# Patient Record
Sex: FEMALE | Race: WHITE | Employment: OTHER | URBAN - METROPOLITAN AREA
[De-identification: names, ages, dates, MRNs, and addresses within clinical notes are randomized per-mention and may not be internally consistent; named-entity substitution may affect disease eponyms.]

---

## 2017-11-09 ENCOUNTER — HOSPITAL ENCOUNTER (OUTPATIENT)
Dept: CT IMAGING | Age: 52
Discharge: HOME | End: 2017-11-09
Payer: COMMERCIAL

## 2017-11-09 DIAGNOSIS — S70.12XA CONTUSION OF LEFT THIGH: ICD-10-CM

## 2017-11-09 PROCEDURE — 73700 CT LOWER EXTREMITY W/O DYE: CPT

## 2017-11-28 ENCOUNTER — HOSPITAL ENCOUNTER (OUTPATIENT)
Dept: CT IMAGING | Age: 52
Discharge: HOME | End: 2017-11-28
Payer: COMMERCIAL

## 2017-11-28 DIAGNOSIS — M79.81 NONTRAUMATIC HEMATOMA OF SOFT TISSUE: ICD-10-CM

## 2017-11-28 DIAGNOSIS — M79.89 SWELLING OF LIMB: ICD-10-CM

## 2017-11-28 PROCEDURE — 73700 CT LOWER EXTREMITY W/O DYE: CPT

## 2017-12-22 ENCOUNTER — TELEPHONE (OUTPATIENT)
Dept: FAMILY MEDICINE CLINIC | Age: 52
End: 2017-12-22
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
## 2017-12-26 LAB — CREATININE, EXTERNAL: 0.74

## 2018-01-09 ENCOUNTER — HOSPITAL ENCOUNTER (OUTPATIENT)
Dept: GENERAL RADIOLOGY | Age: 53
Discharge: HOME | End: 2018-01-09
Payer: COMMERCIAL

## 2018-01-09 ENCOUNTER — HOSPITAL ENCOUNTER (OUTPATIENT)
Dept: MAMMOGRAPHY | Age: 53
Discharge: HOME | End: 2018-01-09
Attending: FAMILY MEDICINE
Payer: COMMERCIAL

## 2018-01-09 ENCOUNTER — TELEPHONE (OUTPATIENT)
Dept: FAMILY MEDICINE CLINIC | Age: 53
End: 2018-01-09

## 2018-01-09 ENCOUNTER — OFFICE VISIT (OUTPATIENT)
Dept: FAMILY MEDICINE CLINIC | Age: 53
End: 2018-01-09

## 2018-01-09 VITALS
HEIGHT: 64 IN | DIASTOLIC BLOOD PRESSURE: 71 MMHG | TEMPERATURE: 97.8 F | SYSTOLIC BLOOD PRESSURE: 107 MMHG | RESPIRATION RATE: 18 BRPM | WEIGHT: 144 LBS | BODY MASS INDEX: 24.59 KG/M2 | HEART RATE: 84 BPM

## 2018-01-09 DIAGNOSIS — Z87.81 HISTORY OF PELVIC FRACTURE: ICD-10-CM

## 2018-01-09 DIAGNOSIS — Z12.39 BREAST CANCER SCREENING: ICD-10-CM

## 2018-01-09 DIAGNOSIS — Z87.81 HISTORY OF LOWER LEG FRACTURE: ICD-10-CM

## 2018-01-09 DIAGNOSIS — Z23 ENCOUNTER FOR IMMUNIZATION: ICD-10-CM

## 2018-01-09 DIAGNOSIS — Q89.01 SPLEEN ABSENT: ICD-10-CM

## 2018-01-09 DIAGNOSIS — Z87.09 HISTORY OF PNEUMOTHORAX: ICD-10-CM

## 2018-01-09 DIAGNOSIS — M79.642 LEFT HAND PAIN: ICD-10-CM

## 2018-01-09 DIAGNOSIS — Z87.81 HISTORY OF RIB FRACTURE: ICD-10-CM

## 2018-01-09 DIAGNOSIS — S06.9X4A: Primary | ICD-10-CM

## 2018-01-09 DIAGNOSIS — R60.0 LOWER LEG EDEMA: ICD-10-CM

## 2018-01-09 PROBLEM — S06.9XAA CLOSED TBI (TRAUMATIC BRAIN INJURY): Status: ACTIVE | Noted: 2018-01-09

## 2018-01-09 PROCEDURE — 77067 SCR MAMMO BI INCL CAD: CPT

## 2018-01-09 PROCEDURE — 73140 X-RAY EXAM OF FINGER(S): CPT

## 2018-01-09 RX ORDER — LANSOPRAZOLE 30 MG/1
30 CAPSULE, DELAYED RELEASE ORAL
COMMUNITY
End: 2018-01-30

## 2018-01-09 RX ORDER — FUROSEMIDE 20 MG/1
20 TABLET ORAL DAILY
Qty: 30 TAB | Refills: 0 | Status: SHIPPED | OUTPATIENT
Start: 2018-01-09 | End: 2018-01-30 | Stop reason: SDUPTHER
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
## 2018-01-09 NOTE — PATIENT INSTRUCTIONS
Vaccine Information Statement    Influenza (Flu) Vaccine (Inactivated or Recombinant): What you need to know    Many Vaccine Information Statements are available in [place] and other languages. See www.immunize.org/vis  Hojas de Información Sobre Vacunas están disponibles en Español y en muchos otros idiomas. Visite www.immunize.org/vis    1. Why get vaccinated? Influenza (flu) is a contagious disease that spreads around the [place] every year, usually between October and May. Flu is caused by influenza viruses, and is spread mainly by coughing, sneezing, and close contact. Anyone can get flu. Flu strikes suddenly and can last several days. Symptoms vary by age, but can include:   fever/chills   sore throat   muscle aches   fatigue   cough   headache    runny or stuffy nose    Flu can also lead to pneumonia and blood infections, and cause diarrhea and seizures in children. If you have a medical condition, such as heart or lung disease, flu can make it worse. Flu is more dangerous for some people. Infants and young children, people 72years of age and older, pregnant women, and people with certain health conditions or a weakened immune system are at greatest risk. Each year thousands of people in the [facility] die from flu, and many more are hospitalized. Flu vaccine can:   keep you from getting flu,   make flu less severe if you do get it, and   keep you from spreading flu to your family and other people. 2. Inactivated and recombinant flu vaccines    A dose of flu vaccine is recommended every flu season. Children 6 months through 6years of age may need two doses during the same flu season. Everyone else needs only one dose each flu season.        Some inactivated flu vaccines contain a very small amount of a mercury-based preservative called thimerosal. Studies have not shown thimerosal in vaccines to be harmful, but flu vaccines that do not contain thimerosal are available. There is no live flu virus in flu shots. They cannot cause the flu. There are many flu viruses, and they are always changing. Each year a new flu vaccine is made to protect against three or four viruses that are likely to cause disease in the upcoming flu season. But even when the vaccine doesnt exactly match these viruses, it may still provide some protection    Flu vaccine cannot prevent:   flu that is caused by a virus not covered by the vaccine, or   illnesses that look like flu but are not. It takes about 2 weeks for protection to develop after vaccination, and protection lasts through the flu season. 3. Some people should not get this vaccine    Tell the person who is giving you the vaccine:     If you have any severe, life-threatening allergies. If you ever had a life-threatening allergic reaction after a dose of flu vaccine, or have a severe allergy to any part of this vaccine, you may be advised not to get vaccinated. Most, but not all, types of flu vaccine contain a small amount of egg protein.  If you ever had Guillain-Barré Syndrome (also called GBS). Some people with a history of GBS should not get this vaccine. This should be discussed with your doctor.  If you are not feeling well. It is usually okay to get flu vaccine when you have a mild illness, but you might be asked to come back when you feel better. 4. Risks of a vaccine reaction    With any medicine, including vaccines, there is a chance of reactions. These are usually mild and go away on their own, but serious reactions are also possible. Most people who get a flu shot do not have any problems with it.      Minor problems following a flu shot include:    soreness, redness, or swelling where the shot was given     hoarseness   sore, red or itchy eyes   cough   fever   aches   headache   itching   fatigue  If these problems occur, they usually begin soon after the shot and last 1 or 2 days. More serious problems following a flu shot can include the following:     There may be a small increased risk of Guillain-Barré Syndrome (GBS) after inactivated flu vaccine. This risk has been estimated at 1 or 2 additional cases per million people vaccinated. This is much lower than the risk of severe complications from flu, which can be prevented by flu vaccine.  Young children who get the flu shot along with pneumococcal vaccine (PCV13) and/or DTaP vaccine at the same time might be slightly more likely to have a seizure caused by fever. Ask your doctor for more information. Tell your doctor if a child who is getting flu vaccine has ever had a seizure. Problems that could happen after any injected vaccine:      People sometimes faint after a medical procedure, including vaccination. Sitting or lying down for about 15 minutes can help prevent fainting, and injuries caused by a fall. Tell your doctor if you feel dizzy, or have vision changes or ringing in the ears.  Some people get severe pain in the shoulder and have difficulty moving the arm where a shot was given. This happens very rarely.  Any medication can cause a severe allergic reaction. Such reactions from a vaccine are very rare, estimated at about 1 in a million doses, and would happen within a few minutes to a few hours after the vaccination. As with any medicine, there is a very remote chance of a vaccine causing a serious injury or death. The safety of vaccines is always being monitored. For more information, visit: www.cdc.gov/vaccinesafety/    5. What if there is a serious reaction? What should I look for?  Look for anything that concerns you, such as signs of a severe allergic reaction, very high fever, or unusual behavior.     Signs of a severe allergic reaction can include hives, swelling of the face and throat, difficulty breathing, a fast heartbeat, dizziness, and weakness  usually within a few minutes to a few hours after the vaccination. What should I do?  If you think it is a severe allergic reaction or other emergency that cant wait, call 9-1-1 and get the person to the nearest hospital. Otherwise, call your doctor.  Reactions should be reported to the Vaccine Adverse Event Reporting System (VAERS). Your doctor should file this report, or you can do it yourself through  the VAERS web site at www.vaers. [facility].gov, or by calling 5-326.774.2459. VAERS does not give medical advice. 6. The National Vaccine Injury Compensation Program    The [facility] Vaccine Injury Compensation Program (VICP) is a federal program that was created to compensate people who may have been injured by certain vaccines. Persons who believe they may have been injured by a vaccine can learn about the program and about filing a claim by calling 8-261.838.6829 or visiting the [employer] website at www.[facility].gov/vaccinecompensation. There is a time limit to file a claim for compensation. 7. How can I learn more?  Ask your healthcare provider. He or she can give you the vaccine package insert or suggest other sources of information.  Call your local or state health department.  Contact the Centers for Disease Control and Prevention (CDC):  - Call 6-807.249.8052 (1-800-CDC-INFO) or  - Visit CDCs website at www.cdc.gov/flu    Vaccine Information Statement   Inactivated Influenza Vaccine   8/7/2015  42 LEVI Ochoa Amaya [id number]    Department of Health and Human Services  Centers for Disease Control and Prevention    Office Use Only             Leg and Ankle Edema: Care Instructions  Your Care Instructions  Swelling in the legs, ankles, and feet is called edema. It is common after you sit or stand for a while. Long plane flights or car rides often cause swelling in the legs and feet. You may also have swelling if you have to stand for long periods of time at your job.  Problems with the veins in the legs (varicose veins) and changes in hormones can also cause swelling. Sometimes the swelling in the ankles and feet is caused by a more serious problem, such as heart failure, infection, blood clots, or liver or kidney disease. Follow-up care is a key part of your treatment and safety. Be sure to make and go to all appointments, and call your doctor if you are having problems. It's also a good idea to know your test results and keep a list of the medicines you take. How can you care for yourself at home? · If your doctor gave you medicine, take it as prescribed. Call your doctor if you think you are having a problem with your medicine. · Whenever you are resting, raise your legs up. Try to keep the swollen area higher than the level of your heart. · Take breaks from standing or sitting in one position. ¨ Walk around to increase the blood flow in your lower legs. ¨ Move your feet and ankles often while you stand, or tighten and relax your leg muscles. · Wear support stockings. Put them on in the morning, before swelling gets worse. · Eat a balanced diet. Lose weight if you need to. · Limit the amount of salt (sodium) in your diet. Salt holds fluid in the body and may increase swelling. When should you call for help? Call 911 anytime you think you may need emergency care. For example, call if:  ? · You have symptoms of a blood clot in your lung (called a pulmonary embolism). These may include:  ¨ Sudden chest pain. ¨ Trouble breathing. ¨ Coughing up blood. ?Call your doctor now or seek immediate medical care if:  ? · You have signs of a blood clot, such as:  ¨ Pain in your calf, back of the knee, thigh, or groin. ¨ Redness and swelling in your leg or groin. ? · You have symptoms of infection, such as:  ¨ Increased pain, swelling, warmth, or redness. ¨ Red streaks or pus. ¨ A fever. ? Watch closely for changes in your health, and be sure to contact your doctor if:  ? · Your swelling is getting worse.    ? · You have new or worsening pain in your legs. ? · You do not get better as expected. Where can you learn more? Go to http://betsey-ammy.info/. Enter C524 in the search box to learn more about \"Leg and Ankle Edema: Care Instructions. \"  Current as of: March 20, 2017  Content Version: 11.4  © 2359-0202 [employer]. Care instructions adapted under license by [employer] (which disclaims liability or warranty for this information). If you have questions about a medical condition or this instruction, always ask your healthcare professional. [place] [address] any warranty or liability for your use of this information.
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
## 2018-01-09 NOTE — MR AVS SNAPSHOT
Visit Information Date & Time Provider Department Dept. Phone Encounter #  
 1/9/2018  1:00 PM Andre Hardy 34 [id number] Follow-up Instructions Return in about 2 weeks (around 1/23/2018) for leg swelling. Upcoming Health Maintenance Date Due DTaP/Tdap/Td series (1 - Tdap) 6/9/1986 BREAST CANCER SCRN MAMMOGRAM 7/30/2017 PAP AKA CERVICAL CYTOLOGY 4/1/2018 COLONOSCOPY 8/25/2020 Allergies as of 1/9/2018  Review Complete On: 1/9/2018 By: Paulo Velasquez MD  
 No Known Allergies Current Immunizations  Reviewed on 1/9/2018 Name Date Influenza Vaccine (Quad) PF 1/9/2018  1:27 PM  
  
 Reviewed by Chris Jain LPN on 7/8/9060 at  5:32 PM  
You Were Diagnosed With   
  
 Codes Comments Breast cancer screening    -  Primary ICD-10-CM: Z12.31 
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 9X4A 
ICD-9-CM: 854.03 History of pelvic fracture     ICD-10-CM: Z87.81 ICD-9-CM: V15.51 Left hand pain     ICD-10-CM: B42.028 ICD-9-CM: 729.5 Lower leg edema     ICD-10-CM: R60.0 ICD-9-CM: 782.3 Spleen absent     ICD-10-CM: Q89.01 
ICD-9-CM: 759.0 Vitals BP Pulse Temp Resp Height(growth percentile) Weight(growth percentile) 107/71 84 97.8 °F (36.6 °C) (Oral) 18 5' 4\" (1.626 m) 144 lb (65.3 kg) LMP BMI OB Status Smoking Status 12/20/2013 24.72 kg/m2 Postmenopausal Former Smoker Vitals History BMI and BSA Data Body Mass Index Body Surface Area 24.72 kg/m 2 1.72 m 2 Preferred Pharmacy Pharmacy Name Phone [address] [phone] Your Updated Medication List  
  
   
 This list is accurate as of: 1/9/18  2:25 PM.  Always use your most recent med list.  
  
  
  
  
 furosemide 20 mg tablet Commonly known as:  LASIX Take 1 Tab by mouth daily. lansoprazole 30 mg capsule Commonly known as:  PREVACID Take 30 mg by mouth. Prescriptions Sent to Pharmacy Refills  
 furosemide (LASIX) 20 mg tablet 0 Sig: Take 1 Tab by mouth daily. Class: Normal  
 Pharmacy: [facility] [address], [address] [facility] Miky Valdez Ph #: [phone] Route: Oral  
  
We Performed the Following INFLUENZA VIRUS VAC QUAD,SPLIT,PRESV FREE SYRINGE IM [id number] CPT(R)] METABOLIC PANEL, BASIC [26593 CPT(R)] REFERRAL TO ORTHOPEDICS [ZSU540 Custom] Comments:  
 Left hand pain, decreased range of motion after severe trauma Follow-up Instructions Return in about 2 weeks (around 1/23/2018) for leg swelling. To-Do List   
 01/09/2018 Imaging:  CARRILLO MAMMO BI SCREENING INCL CAD   
  
 01/09/2018 Imaging:  XR 4TH FINGER LT MIN 2 V Referral Information Referral ID Referred By Referred To  
  
 [id number] Elder Suh MD   
   [address] [place] [address] Phone: [phone] Fax: [phone] Visits Status Start Date End Date 1 New Request 1/9/18 1/9/19 If your referral has a status of pending review or denied, additional information will be sent to support the outcome of this decision. Patient Instructions Vaccine Information Statement Influenza (Flu) Vaccine (Inactivated or Recombinant): What you need to know Many Vaccine Information Statements are available in [place]ian and other languages. See www.immunize.org/vis Hojas de Información Sobre Vacunas están disponibles en Español y en muchos otros idiomas. Visite www.immunize.org/vis 1. Why get vaccinated? Influenza (flu) is a contagious disease that spreads around the [facility] every year, usually between October and May. Flu is caused by influenza viruses, and is spread mainly by coughing, sneezing, and close contact. Anyone can get flu. Flu strikes suddenly and can last several days. Symptoms vary by age, but can include: 
 fever/chills  sore throat  muscle aches  fatigue  cough  headache  runny or stuffy nose Flu can also lead to pneumonia and blood infections, and cause diarrhea and seizures in children. If you have a medical condition, such as heart or lung disease, flu can make it worse. Flu is more dangerous for some people. Infants and young children, people 72years of age and older, pregnant women, and people with certain health conditions or a weakened immune system are at greatest risk. Each year thousands of people in the [facility] die from flu, and many more are hospitalized. Flu vaccine can: 
 keep you from getting flu, 
 make flu less severe if you do get it, and 
 keep you from spreading flu to your family and other people. 2. Inactivated and recombinant flu vaccines A dose of flu vaccine is recommended every flu season. Children 6 months through 6years of age may need two doses during the same flu season. Everyone else needs only one dose each flu season. Some inactivated flu vaccines contain a very small amount of a mercury-based preservative called thimerosal. Studies have not shown thimerosal in vaccines to be harmful, but flu vaccines that do not contain thimerosal are available. There is no live flu virus in flu shots. They cannot cause the flu. There are many flu viruses, and they are always changing. Each year a new flu vaccine is made to protect against three or four viruses that are likely to cause disease in the upcoming flu season.  But even when the vaccine doesnt exactly match these viruses, it may still provide some protection Flu vaccine cannot prevent: 
 flu that is caused by a virus not covered by the vaccine, or 
 illnesses that look like flu but are not. It takes about 2 weeks for protection to develop after vaccination, and protection lasts through the flu season. 3. Some people should not get this vaccine Tell the person who is giving you the vaccine:  If you have any severe, life-threatening allergies. If you ever had a life-threatening allergic reaction after a dose of flu vaccine, or have a severe allergy to any part of this vaccine, you may be advised not to get vaccinated. Most, but not all, types of flu vaccine contain a small amount of egg protein.  If you ever had Guillain-Barré Syndrome (also called GBS). Some people with a history of GBS should not get this vaccine. This should be discussed with your doctor.  If you are not feeling well. It is usually okay to get flu vaccine when you have a mild illness, but you might be asked to come back when you feel better. 4. Risks of a vaccine reaction With any medicine, including vaccines, there is a chance of reactions. These are usually mild and go away on their own, but serious reactions are also possible. Most people who get a flu shot do not have any problems with it. Minor problems following a flu shot include:  
 soreness, redness, or swelling where the shot was given  hoarseness  sore, red or itchy eyes  cough  fever  aches  headache  itching  fatigue If these problems occur, they usually begin soon after the shot and last 1 or 2 days. More serious problems following a flu shot can include the following:  There may be a small increased risk of Guillain-Barré Syndrome (GBS) after inactivated flu vaccine.   This risk has been estimated at 1 or 2 additional cases per million people vaccinated. This is much lower than the risk of severe complications from flu, which can be prevented by flu vaccine.  Young children who get the flu shot along with pneumococcal vaccine (PCV13) and/or DTaP vaccine at the same time might be slightly more likely to have a seizure caused by fever. Ask your doctor for more information. Tell your doctor if a child who is getting flu vaccine has ever had a seizure. Problems that could happen after any injected vaccine:  People sometimes faint after a medical procedure, including vaccination. Sitting or lying down for about 15 minutes can help prevent fainting, and injuries caused by a fall. Tell your doctor if you feel dizzy, or have vision changes or ringing in the ears.  Some people get severe pain in the shoulder and have difficulty moving the arm where a shot was given. This happens very rarely.  Any medication can cause a severe allergic reaction. Such reactions from a vaccine are very rare, estimated at about 1 in a million doses, and would happen within a few minutes to a few hours after the vaccination. As with any medicine, there is a very remote chance of a vaccine causing a serious injury or death. The safety of vaccines is always being monitored. For more information, visit: www.cdc.gov/vaccinesafety/ 
 
5. What if there is a serious reaction? What should I look for?  Look for anything that concerns you, such as signs of a severe allergic reaction, very high fever, or unusual behavior. Signs of a severe allergic reaction can include hives, swelling of the face and throat, difficulty breathing, a fast heartbeat, dizziness, and weakness  usually within a few minutes to a few hours after the vaccination. What should I do?  
 
 If you think it is a severe allergic reaction or other emergency that cant wait, call 9-1-1 and get the person to the nearest hospital. Otherwise, call your doctor.  Reactions should be reported to the Vaccine Adverse Event Reporting System (VAERS). Your doctor should file this report, or you can do it yourself through  the VAERS web site at www.vaers. hhs.gov, or by calling 8-383.152.3411. VAERS does not give medical advice. 6. The National Vaccine Injury Compensation Program 
 
The [facility] Vaccine Injury Compensation Program (VICP) is a federal program that was created to compensate people who may have been injured by certain vaccines. Persons who believe they may have been injured by a vaccine can learn about the program and about filing a claim by calling 4-784.551.9824 or visiting the [employer] website at www.[facility].gov/vaccinecompensation. There is a time limit to file a claim for compensation. 7. How can I learn more?  Ask your healthcare provider. He or she can give you the vaccine package insert or suggest other sources of information.  Call your local or state health department.  Contact the Centers for Disease Control and Prevention (CDC): 
- Call 2-504.330.4524 (1-800-CDC-INFO) or 
- Visit CDCs website at www.cdc.gov/flu Vaccine Information Statement Inactivated Influenza Vaccine 8/7/2015 
[facility] 004ZO[facility]17 [facility] and [place] Centers for Disease Control and Prevention Office Use Only Leg and Ankle Edema: Care Instructions Your Care Instructions Swelling in the legs, ankles, and feet is called edema. It is common after you sit or stand for a while. Long plane flights or car rides often cause swelling in the legs and feet. You may also have swelling if you have to stand for long periods of time at your job. Problems with the veins in the legs (varicose veins) and changes in hormones can also cause swelling. Sometimes the swelling in the ankles and feet is caused by a more serious problem, such as heart failure, infection, blood clots, or liver or kidney disease. Follow-up care is a key part of your treatment and safety. Be sure to make and go to all appointments, and call your doctor if you are having problems. It's also a good idea to know your test results and keep a list of the medicines you take. How can you care for yourself at home? · If your doctor gave you medicine, take it as prescribed. Call your doctor if you think you are having a problem with your medicine. · Whenever you are resting, raise your legs up. Try to keep the swollen area higher than the level of your heart. · Take breaks from standing or sitting in one position. ¨ Walk around to increase the blood flow in your lower legs. ¨ Move your feet and ankles often while you stand, or tighten and relax your leg muscles. · Wear support stockings. Put them on in the morning, before swelling gets worse. · Eat a balanced diet. Lose weight if you need to. · Limit the amount of salt (sodium) in your diet. Salt holds fluid in the body and may increase swelling. When should you call for help? Call 911 anytime you think you may need emergency care. For example, call if: 
? · You have symptoms of a blood clot in your lung (called a pulmonary embolism). These may include: 
¨ Sudden chest pain. ¨ Trouble breathing. ¨ Coughing up blood. ?Call your doctor now or seek immediate medical care if: 
? · You have signs of a blood clot, such as: 
¨ Pain in your calf, back of the knee, thigh, or groin. ¨ Redness and swelling in your leg or groin. ? · You have symptoms of infection, such as: 
¨ Increased pain, swelling, warmth, or redness. ¨ Red streaks or pus. ¨ A fever. ? Watch closely for changes in your health, and be sure to contact your doctor if: 
? · Your swelling is getting worse. ? · You have new or worsening pain in your legs. ? · You do not get better as expected. Where can you learn more? Go to http://betsey-ammy.info/. Enter Q707 in the search box to learn more about \"Leg and Ankle Edema: Care Instructions. \" Current as of: March 20, 2017 Content Version: 11.4 © 8204-4483 Healthwise, Incorporated. Care instructions adapted under license by [employer] (which disclaims liability or warranty for this information). If you have questions about a medical condition or this instruction, always ask your healthcare professional. [address] any warranty or liability for your use of this information. Introducing [facility] & HEALTH SERVICES! [place] introduces [employer] patient portal. Now you can access parts of your medical record, email your doctor's office, and request medication refills online. 1. In your internet browser, go to [email or web address]. [facility] Directions/[employer] 2. Click on the First Time User? Click Here link in the Sign In box. You will see the New Member Sign Up page. 3. Enter your [employer] Access Code exactly as it appears below. You will not need to use this code after youve completed the sign-up process. If you do not sign up before the expiration date, you must request a new code. · [employer] Access Code: [email or web address] Expires: 1/18/2018 10:21 AM 
 
4. Enter the last four digits of your Social Security Number (xxxx) and Date of Birth (mm/dd/yyyy) as indicated and click Submit. You will be taken to the next sign-up page. 5. Create a [employer] ID. This will be your [employer] login ID and cannot be changed, so think of one that is secure and easy to remember. 6. Create a [employer] password. You can change your password at any time. 7. Enter your Password Reset Question and Answer. This can be used at a later time if you forget your password. 8. Enter your e-mail address. You will receive e-mail notification when new information is available in [address] 9. Click Sign Up. You can now view and download portions of your medical record. 10. Click the Download Summary menu link to download a portable copy of your medical information. If you have questions, please visit the Frequently Asked Questions section of the [employer] website. Remember, [employer] is NOT to be used for urgent needs. For medical emergencies, dial 911. Now available from your iPhone and Android! Please provide this summary of care documentation to your next provider. Your primary care clinician is listed as Neno Coulter. If you have any questions after today's visit, please call [phone].

## 2018-01-10 LAB
BUN SERPL-MCNC: 12 MG/DL (ref 6–24)
BUN/CREAT SERPL: 19 (ref 9–23)
CALCIUM SERPL-MCNC: 9 MG/DL (ref 8.7–10.2)
CHLORIDE SERPL-SCNC: 103 MMOL/L (ref 96–106)
CO2 SERPL-SCNC: 27 MMOL/L (ref 18–29)
CREAT SERPL-MCNC: 0.64 MG/DL (ref 0.57–1)
GLUCOSE SERPL-MCNC: 94 MG/DL (ref 65–99)
POTASSIUM SERPL-SCNC: 4.2 MMOL/L (ref 3.5–5.2)
SODIUM SERPL-SCNC: 144 MMOL/L (ref 134–144)

## 2018-01-10 NOTE — TELEPHONE ENCOUNTER
Please call patient and let her know she has a small fracture at the tip of her finger and osteoarthritis. When does she see the hand surgeon?

## 2018-01-30 ENCOUNTER — OFFICE VISIT (OUTPATIENT)
Dept: FAMILY MEDICINE CLINIC | Age: 53
End: 2018-01-30

## 2018-01-30 VITALS
RESPIRATION RATE: 18 BRPM | DIASTOLIC BLOOD PRESSURE: 72 MMHG | WEIGHT: 140 LBS | TEMPERATURE: 98 F | HEIGHT: 64 IN | SYSTOLIC BLOOD PRESSURE: 112 MMHG | HEART RATE: 81 BPM | BODY MASS INDEX: 23.9 KG/M2

## 2018-01-30 DIAGNOSIS — R60.0 LOWER LEG EDEMA: ICD-10-CM

## 2018-01-30 RX ORDER — FUROSEMIDE 40 MG/1
40 TABLET ORAL DAILY
Qty: 30 TAB | Refills: 1 | Status: SHIPPED | OUTPATIENT
Start: 2018-01-30 | End: 2018-06-04

## 2018-01-30 NOTE — MR AVS SNAPSHOT
[address] [facility] 
[phone] Patient: Patti Colmenares MRN: [id number] :1965 Visit Information Date & Time Provider Department Dept. Phone Encounter #  
 2018  9:00 AM Andre Celis 34 [id number] Follow-up Instructions Return in about 1 month (around 2018) for leg swelling. Upcoming Health Maintenance Date Due  
 MenB Meningococcal topic (1 of 2 - Bexsero 2-Dose Series) 1975 Pneumococcal 19-64 Highest Risk (1 of 3 - PCV13) 1984 DTaP/Tdap/Td series (1 - Tdap) 1986 PAP AKA CERVICAL CYTOLOGY 2018 BREAST CANCER SCRN MAMMOGRAM 2020 COLONOSCOPY 2020 Allergies as of 2018  Review Complete On: 2018 By: Shimon Mulligan MD  
 No Known Allergies Current Immunizations  Reviewed on 2018 Name Date Influenza Vaccine (Quad) PF 2018  1:27 PM  
  
 Not reviewed this visit You Were Diagnosed With   
  
 Codes Comments Lower leg edema     ICD-10-CM: R60.0 ICD-9-CM: 170. 3 Vitals BP Pulse Temp Resp Height(growth percentile) Weight(growth percentile) 112/72 81 98 °F (36.7 °C) (Oral) 18 5' 4\" (1.626 m) 140 lb (63.5 kg) LMP BMI OB Status Smoking Status 2013 24.03 kg/m2 Postmenopausal Former Smoker Vitals History BMI and BSA Data Body Mass Index Body Surface Area 24.03 kg/m 2 1.69 m 2 Preferred Pharmacy Pharmacy Name Phone [address] [phone] Your Updated Medication List  
  
   
This list is accurate as of: 18  9:49 AM.  Always use your most recent med list.  
  
  
  
  
 furosemide 40 mg tablet Commonly known as:  LASIX Take 1 Tab by mouth daily. Prescriptions Sent to Pharmacy Refills furosemide (LASIX) 40 mg tablet 1 Sig: Take 1 Tab by mouth daily. Class: Normal  
 Pharmacy: [facility] [address] [facility] CHRISTIANO Valdez  #: [phone] Route: Oral  
  
We Performed the Following METABOLIC PANEL, BASIC [56194 CPT(R)] Follow-up Instructions Return in about 1 month (around 2/28/2018) for leg swelling. Introducing [facility] & [facility]! [place] introduces [employer] patient portal. Now you can access parts of your medical record, email your doctor's office, and request medication refills online. 1. In your internet browser, go to [email or web address] 2. Click on the First Time User? Click Here link in the Sign In box. You will see the New Member Sign Up page. 3. Enter your [employer] Access Code exactly as it appears below. You will not need to use this code after youve completed the sign-up process. If you do not sign up before the expiration date, you must request a new code. · [employer] Access Code: [email or web address] Expires: 4/30/2018  9:49 AM 
 
4. Enter the last four digits of your Social Security Number (xxxx) and Date of Birth (mm/dd/yyyy) as indicated and click Submit. You will be taken to the next sign-up page. 5. Create a [employer] ID. This will be your [employer] login ID and cannot be changed, so think of one that is secure and easy to remember. 6. Create a [employer] password. You can change your password at any time. 7. Enter your Password Reset Question and Answer. This can be used at a later time if you forget your password. 8. Enter your e-mail address. You will receive e-mail notification when new information is available in [address] 9. Click Sign Up. You can now view and download portions of your medical record. 10. Click the Download Summary menu link to download a portable copy of your medical information. If you have questions, please visit the Frequently Asked Questions section of the [employer] website. Remember, [email or web address] is NOT to be used for urgent needs. For medical emergencies, dial 911. Now available from your iPhone and Android! Please provide this summary of care documentation to your next provider. Your primary care clinician is listed as Goldy Basurto. If you have any questions after today's visit, please call [phone].

## 2018-01-30 NOTE — PROGRESS NOTES
Chief Complaint   Patient presents with    Leg Swelling     3 wk f/u    Medication Evaluation     lasix     1. Have you been to the ER, urgent care clinic since your last visit? No  Hospitalized since your last visit? No    2. Have you seen or consulted any other health care providers outside of the [address] [place] [address] since your last visit? Include any pap smears or colon screening.  No

## 2018-01-30 NOTE — PROGRESS NOTES
HISTORY OF PRESENT ILLNESS  Treasure Aldana is a 46 y.o. female. Leg Swelling   The history is provided by the patient. This is a chronic problem. Episode onset: since August. The problem occurs daily. Progression since onset: unsure. Pertinent negatives include no chest pain and no shortness of breath. The symptoms are aggravated by standing (being up and doing things). The symptoms are relieved by medications. Treatments tried: Lasix. Improvement on treatment: unsure. Medication Evaluation   Pertinent negatives include no chest pain and no shortness of breath. Review of Systems   Respiratory: Negative for shortness of breath. Cardiovascular: Positive for leg swelling. Negative for chest pain. Visit Vitals    /72    Pulse 81    Temp 98 °F (36.7 °C) (Oral)    Resp 18    Ht 5' 4\" (1.626 m)    Wt 140 lb (63.5 kg)    LMP 12/20/2013    BMI 24.03 kg/m2     Physical Exam   Constitutional: She is oriented to person, place, and time. She appears well-developed and well-nourished. No distress. Cardiovascular: Normal rate, regular rhythm and normal heart sounds. Exam reveals no gallop and no friction rub. No murmur heard. Pulmonary/Chest: Effort normal and breath sounds normal. No respiratory distress. She has no wheezes. She has no rales. Musculoskeletal: She exhibits edema. 2+ LE edema on the left, trace on the left   Neurological: She is alert and oriented to person, place, and time. Skin: Skin is warm and dry. She is not diaphoretic. Nursing note and vitals reviewed. ASSESSMENT and PLAN    ICD-10-CM ICD-9-CM    1. Lower leg edema R60.0 782.3 furosemide (LASIX) 40 mg tablet      METABOLIC PANEL, BASIC        Improving  Increase Lasix  Labs per orders. Follow-up Disposition:  Return in about 1 month (around 2/28/2018) for leg swelling. Reviewed plan of care. Patient has provided input and agrees with goals.

## 2018-01-31 LAB
BUN SERPL-MCNC: 9 MG/DL (ref 6–24)
BUN/CREAT SERPL: 13 (ref 9–23)
CALCIUM SERPL-MCNC: 9.4 MG/DL (ref 8.7–10.2)
CHLORIDE SERPL-SCNC: 101 MMOL/L (ref 96–106)
CO2 SERPL-SCNC: 25 MMOL/L (ref 18–29)
CREAT SERPL-MCNC: 0.71 MG/DL (ref 0.57–1)
GFR SERPLBLD CREATININE-BSD FMLA CKD-EPI: 113 ML/MIN/1.73
GFR SERPLBLD CREATININE-BSD FMLA CKD-EPI: 98 ML/MIN/1.73
GLUCOSE SERPL-MCNC: 97 MG/DL (ref 65–99)
POTASSIUM SERPL-SCNC: 4.4 MMOL/L (ref 3.5–5.2)
SODIUM SERPL-SCNC: 141 MMOL/L (ref 134–144)

## 2018-03-06 ENCOUNTER — OFFICE VISIT (OUTPATIENT)
Dept: FAMILY MEDICINE CLINIC | Age: 53
End: 2018-03-06

## 2018-03-06 VITALS
BODY MASS INDEX: 24.48 KG/M2 | DIASTOLIC BLOOD PRESSURE: 73 MMHG | SYSTOLIC BLOOD PRESSURE: 128 MMHG | TEMPERATURE: 98.2 F | RESPIRATION RATE: 16 BRPM | HEIGHT: 64 IN | WEIGHT: 143.4 LBS | HEART RATE: 85 BPM

## 2018-03-06 DIAGNOSIS — R60.0 LEG EDEMA, LEFT: Primary | ICD-10-CM

## 2018-03-06 NOTE — MR AVS SNAPSHOT
[address] [phone] Patient: Kathy García MRN: [id number] :1965 Visit Information Date & Time Provider Department Dept. Phone Encounter #  
 3/6/2018  3:45 PM Andre Carpenter 34 [id number] Upcoming Health Maintenance Date Due  
 MenB Meningococcal topic (1 of 2 - Bexsero 2-Dose Series) 1975 Pneumococcal 19-64 Highest Risk (1 of 3 - PCV13) 1984 DTaP/Tdap/Td series (1 - Tdap) 1986 PAP AKA CERVICAL CYTOLOGY 2018 BREAST CANCER SCRN MAMMOGRAM 2020 COLONOSCOPY 2020 Allergies as of 3/6/2018  Review Complete On: 3/6/2018 By: Kay Rivas MD  
 No Known Allergies Current Immunizations  Reviewed on 2018 Name Date Influenza Vaccine (Quad) PF 2018  1:27 PM  
  
 Not reviewed this visit You Were Diagnosed With   
  
 Codes Comments Leg edema, left    -  Primary ICD-10-CM: R60.0 ICD-9-CM: 257. 3 Vitals BP Pulse Temp Resp Height(growth percentile) Weight(growth percentile) 128/73 (BP 1 Location: Left arm, BP Patient Position: Sitting) 85 98.2 °F (36.8 °C) (Oral) 16 5' 4\" (1.626 m) 143 lb 6.4 oz (65 kg) LMP BMI OB Status Smoking Status 2013 24.61 kg/m2 Postmenopausal Former Smoker Vitals History BMI and BSA Data Body Mass Index Body Surface Area  
 24.61 kg/m 2 1.71 m 2 Preferred Pharmacy Pharmacy Name Phone [address] [phone] Your Updated Medication List  
  
   
This list is accurate as of 3/6/18  4:40 PM.  Always use your most recent med list.  
  
  
  
  
 DAILY MULTIVITAMIN PO Take  by mouth daily. furosemide 40 mg tablet Commonly known as:  LASIX Take 1 Tab by mouth daily.   
  
 OMEGA 3 FISH OIL PO  
 Take  by mouth daily. We Performed the Following METABOLIC PANEL, BASIC [08679 CPT(R)] Introducing \[facility]\"" & HEALTH SERVICES! [facility] introduces [employer] patient portal. Now you can access parts of your medical record, email your doctor's office, and request medication refills online. 1. In your internet browser, go to [email or web address] 2. Click on the First Time User? Click Here link in the Sign In box. You will see the New Member Sign Up page. 3. Enter your Canary Access Code exactly as it appears below. You will not need to use this code after youve completed the sign-up process. If you do not sign up before the expiration date, you must request a new code. · Canary Access Code: [email or web address] Expires: 4/30/2018  9:49 AM 
 
4. Enter the last four digits of your Social Security Number (xxxx) and Date of Birth (mm/dd/yyyy) as indicated and click Submit. You will be taken to the next sign-up page. 5. Create a Canary ID. This will be your Canary login ID and cannot be changed, so think of one that is secure and easy to remember. 6. Create a Canary password. You can change your password at any time. 7. Enter your Password Reset Question and Answer. This can be used at a later time if you forget your password. 8. Enter your e-mail address. You will receive e-mail notification when new information is available in [address] 9. Click Sign Up. You can now view and download portions of your medical record. 10. Click the Download Summary menu link to download a portable copy of your medical information. If you have questions, please visit the Frequently Asked Questions section of the Canary website. Remember, Canary is NOT to be used for urgent needs. For medical emergencies, dial 911. Now available from your iPhone and Android! Please provide this summary of care documentation to your next provider. Your primary care clinician is listed as Author Daubs. If you have any questions after today's visit, please call [phone].

## 2018-03-06 NOTE — PROGRESS NOTES
HISTORY OF PRESENT ILLNESS  Jeana Conde is a 46 y.o. female. Leg Swelling   The history is provided by the patient (left. She increased her Lasix.). This is a chronic problem. Episode onset: since August. The problem occurs constantly. The problem has not changed since onset. Pertinent negatives include no chest pain and no shortness of breath. Nothing relieves the symptoms. Treatments tried: 20 mg Lasix. The treatment provided mild relief. Review of Systems   Respiratory: Negative for cough, sputum production, shortness of breath and wheezing. Cardiovascular: Positive for leg swelling. Negative for chest pain. Visit Vitals    /73 (BP 1 Location: Left arm, BP Patient Position: Sitting)    Pulse 85    Temp 98.2 °F (36.8 °C) (Oral)    Resp 16    Ht 5' 4\" (1.626 m)    Wt 143 lb 6.4 oz (65 kg)    LMP 12/20/2013    BMI 24.61 kg/m2     Physical Exam   Constitutional: She is oriented to person, place, and time. She appears well-developed and well-nourished. No distress. Cardiovascular: Normal rate, regular rhythm and normal heart sounds. Exam reveals no gallop and no friction rub. No murmur heard. Pulmonary/Chest: Effort normal and breath sounds normal. No respiratory distress. She has no wheezes. She has no rales. Musculoskeletal: She exhibits edema. Trace LLE edema, none on the right   Neurological: She is alert and oriented to person, place, and time. Skin: Skin is warm and dry. She is not diaphoretic. Nursing note and vitals reviewed. ASSESSMENT and PLAN    ICD-10-CM ICD-9-CM    1. Leg edema, left T79.4 685.8 METABOLIC PANEL, BASIC        Significantly better  Continue Lasix  BMP    Follow-up Disposition:  Return if symptoms worsen or fail to improve. Reviewed plan of care. Patient has provided input and agrees with goals.

## 2018-03-06 NOTE — PROGRESS NOTES
Chief Complaint   Patient presents with    Leg Swelling     left lower leg swelling follow up     1. Have you been to the ER, urgent care clinic since your last visit? No. Hospitalized since your last visit? No.    2. Have you seen or consulted any other health care providers outside of the [address] [place] [address] since your last visit? Yes. Had removal of IVC filter at [facility] on 2/19/18.

## 2018-03-07 LAB
BUN SERPL-MCNC: 13 MG/DL (ref 6–24)
BUN/CREAT SERPL: 20 (ref 9–23)
CALCIUM SERPL-MCNC: 9 MG/DL (ref 8.7–10.2)
CHLORIDE SERPL-SCNC: 98 MMOL/L (ref 96–106)
CO2 SERPL-SCNC: 26 MMOL/L (ref 18–29)
CREAT SERPL-MCNC: 0.65 MG/DL (ref 0.57–1)
GFR SERPLBLD CREATININE-BSD FMLA CKD-EPI: 102 ML/MIN/1.73
GFR SERPLBLD CREATININE-BSD FMLA CKD-EPI: 118 ML/MIN/1.73
GLUCOSE SERPL-MCNC: 86 MG/DL (ref 65–99)
POTASSIUM SERPL-SCNC: 4.4 MMOL/L (ref 3.5–5.2)
SODIUM SERPL-SCNC: 140 MMOL/L (ref 134–144)

## 2018-06-04 ENCOUNTER — OFFICE VISIT (OUTPATIENT)
Dept: FAMILY MEDICINE CLINIC | Age: 53
End: 2018-06-04

## 2018-06-04 VITALS
SYSTOLIC BLOOD PRESSURE: 107 MMHG | HEART RATE: 77 BPM | TEMPERATURE: 98.3 F | DIASTOLIC BLOOD PRESSURE: 76 MMHG | RESPIRATION RATE: 20 BRPM | BODY MASS INDEX: 23.39 KG/M2 | HEIGHT: 64 IN | WEIGHT: 137 LBS

## 2018-06-04 DIAGNOSIS — Z00.00 ROUTINE GENERAL MEDICAL EXAMINATION AT A HEALTH CARE FACILITY: Primary | ICD-10-CM

## 2018-06-04 DIAGNOSIS — E78.00 PURE HYPERCHOLESTEROLEMIA: ICD-10-CM

## 2018-06-04 NOTE — MR AVS SNAPSHOT
[address] 
[phone] Patient: Hilary Dash MRN: [id number] :1965 Visit Information Date & Time Provider Department Dept. Phone Encounter #  
 2018  8:15 AM Andre Villagran 34 [id number] Follow-up Instructions Return in about 1 year (around 2019) for physical.  
  
Upcoming Health Maintenance Date Due  
 MenB Meningococcal topic (1 of 2 - Bexsero 2-Dose Series) 1975 Pneumococcal 19-64 Highest Risk (1 of 3 - PCV13) 1984 DTaP/Tdap/Td series (1 - Tdap) 1986 PAP AKA CERVICAL CYTOLOGY 2018 Influenza Age 5 to Adult 2018 BREAST CANCER SCRN MAMMOGRAM 2020 COLONOSCOPY 2020 Allergies as of 2018  Review Complete On: 2018 By: Kirt Crespo MD  
 No Known Allergies Current Immunizations  Reviewed on 2018 Name Date Influenza Vaccine (Quad) PF 2018  1:27 PM  
  
 Not reviewed this visit You Were Diagnosed With   
  
 Codes Comments Routine general medical examination at a health care facility    -  Primary ICD-10-CM: Z00.00 ICD-9-CM: V70.0 Pure hypercholesterolemia     ICD-10-CM: E78.00 ICD-9-CM: 272.0 Vitals BP Pulse Temp Resp Height(growth percentile) Weight(growth percentile) 107/76 77 98.3 °F (36.8 °C) (Oral) 20 5' 4\" (1.626 m) 137 lb (62.1 kg) LMP BMI OB Status Smoking Status 2013 23.52 kg/m2 Postmenopausal Former Smoker Vitals History BMI and BSA Data Body Mass Index Body Surface Area  
 23.52 kg/m 2 1.67 m 2 Preferred Pharmacy Pharmacy Name Phone [address] [phone] Your Updated Medication List  
  
   
This list is accurate as of 18  9:19 AM.  Always use your most recent med list.  
 DAILY MULTIVITAMIN PO Take  by mouth daily. We Performed the Following LIPID PANEL [92973 CPT(R)] OCCULT BLOOD, IMMUNOASSAY (FIT) [id number] CPT(R)] Follow-up Instructions Return in about 1 year (around 6/4/2019) for physical.  
  
  
Introducing [address]! [employer] introduces Likeeds patient portal. Now you can access parts of your medical record, email your doctor's office, and request medication refills online. 1. In your internet browser, go to [email or web address] 2. Click on the First Time User? Click Here link in the Sign In box. You will see the New Member Sign Up page. 3. Enter your Likeeds Access Code exactly as it appears below. You will not need to use this code after youve completed the sign-up process. If you do not sign up before the expiration date, you must request a new code. · Likeeds Access Code: [email or web address] Expires: 9/2/2018  9:19 AM 
 
4. Enter the last four digits of your Social Security Number (xxxx) and Date of Birth (mm/dd/yyyy) as indicated and click Submit. You will be taken to the next sign-up page. 5. Create a Likeeds ID. This will be your Likeeds login ID and cannot be changed, so think of one that is secure and easy to remember. 6. Create a Likeeds password. You can change your password at any time. 7. Enter your Password Reset Question and Answer. This can be used at a later time if you forget your password. 8. Enter your e-mail address. You will receive e-mail notification when new information is available in [address] 9. Click Sign Up. You can now view and download portions of your medical record. 10. Click the Download Summary menu link to download a portable copy of your medical information. If you have questions, please visit the Frequently Asked Questions section of the Likeeds website. Remember, Likeeds is NOT to be used for urgent needs. For medical emergencies, dial 911. Now available from your iPhone and Android! Please provide this summary of care documentation to your next provider. Your primary care clinician is listed as Ronel Torres. If you have any questions after today's visit, please call [phone].

## 2018-06-04 NOTE — PROGRESS NOTES
Subjective:  Hilary Dash is a 46 y.o. female here for annual physical exam.  Her last PAP was in 2015 and her HPV was negative. Health Habits. 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 9X9A    History of pelvic fracture Z87.81    Spleen absent Q89.01     Past Medical History:   Diagnosis Date    Closed TBI (traumatic brain injury) ([facility] 1/9/2018    GERD (gastroesophageal reflux disease) 8/29/2016    History of pelvic fracture 1/9/2018    HSV-2 seropositive 2/4/2014    Hyperlipidemia 2/4/2014    Rectocele 3/7/2007    Spleen absent 1/9/2018    TBI (traumatic brain injury) ([facility] 08/13/2017     Past Surgical History:   Procedure Laterality Date    EEG SLEEP DEPRIVED  3/14/2014         IMPLANT BREAST SILICONE/EQ  ? Family History   Problem Relation Age of Onset    Osteoporosis Mother    Priscila Luna Arthritis-rheumatoid Father     Asthma Sister     Breast Cancer Paternal Aunt      Social History   Substance Use Topics    Smoking status: Former Smoker     Packs/day: 0.25    Smokeless tobacco: Never Used    Alcohol use No     No Known Allergies  Current Outpatient Prescriptions   Medication Sig Dispense Refill    MV-MIN/FOLIC/VIT X/JMPDT/MPX85 (DAILY MULTIVITAMIN PO) Take  by mouth daily.           Review of Systems  A comprehensive review of systems was negative except for: Cardiovascular: positive for lower extremity edema  Genitourinary: positive for frequency  Endocrine: positive for diabetic symptoms including polyuria and polydipsia    Objective:  Visit Vitals    /76    Pulse 77    Temp 98.3 °F (36.8 °C) (Oral)    Resp 20    Ht 5' 4\" (1.626 m)    Wt 137 lb (62.1 kg)    LMP 12/20/2013    BMI 23.52 kg/m2     Physical Examination:   General appearance - alert, well appearing, and in no distress  Mental status - alert, oriented to person, place, and time, normal mood, behavior, speech, dress, motor activity, and thought processes  Eyes - pupils equal and reactive, extraocular eye movements intact, sclera anicteric  Ears - bilateral TM's and external ear canals normal  Nose - normal and patent, no erythema, discharge or polyps  Mouth - mucous membranes moist, pharynx normal without lesions and dental hygiene good  Neck - supple, no significant adenopathy, carotids upstroke normal bilaterally, no bruits, thyroid exam: thyroid is normal in size without nodules or tenderness  Lymphatics - no palpable lymphadenopathy, no hepatosplenomegaly  Chest - clear to auscultation, no wheezes, rales or rhonchi, symmetric air entry  Heart - normal rate, regular rhythm, normal S1, S2, no murmurs, rubs, clicks or gallops  Abdomen - soft, nontender, nondistended, no masses or organomegaly  bowel sounds normal  Breasts - breasts appear normal, no suspicious masses, no skin or nipple changes or axillary nodes  Pelvic - examination not indicated  Rectal - Kit for FOBT testing given to patient  Neurological - alert, oriented, normal speech, no focal findings or movement disorder noted  Musculoskeletal - normal gait  Extremities - peripheral pulses normal, no clubbing or cyanosis, pedal edema - trace on the left  Skin - normal coloration and turgor, no rashes, no suspicious skin lesions noted     Assessment/Plan:    ICD-10-CM ICD-9-CM    1. Routine general medical examination at a health care facility Z00.00 V70.0 OCCULT BLOOD, IMMUNOASSAY (FIT)   2.  Pure hypercholesterolemia E78.00 272.0 LIPID PANEL         Breast awareness  Labs per orders. Follow-up Disposition:  Return in about 1 year (around 6/4/2019) for physical.      Reviewed plan of care. Patient has provided input and agrees with goals.

## 2018-06-04 NOTE — PROGRESS NOTES
Chief Complaint   Patient presents with    Well Woman     with PAP       Health Maintenance   Topic Date Due    MenB Meningococcal topic (1 of 2 - Bexsero 2-Dose Series) 06/09/1975    Pneumococcal 19-64 Highest Risk (1 of 3 - PCV13) 06/09/1984    DTaP/Tdap/Td series (1 - Tdap) 06/09/1986    PAP AKA CERVICAL CYTOLOGY  04/01/2018    Influenza Age 9 to Adult  08/01/2018    BREAST CANCER SCRN MAMMOGRAM  01/09/2020    COLONOSCOPY  08/25/2020    Hepatitis C Screening  Completed

## 2018-09-17 ENCOUNTER — OFFICE VISIT (OUTPATIENT)
Dept: FAMILY MEDICINE CLINIC | Age: 53
End: 2018-09-17

## 2018-09-17 VITALS
DIASTOLIC BLOOD PRESSURE: 65 MMHG | BODY MASS INDEX: 28.17 KG/M2 | RESPIRATION RATE: 18 BRPM | SYSTOLIC BLOOD PRESSURE: 110 MMHG | HEIGHT: 64 IN | HEART RATE: 76 BPM | WEIGHT: 165 LBS | TEMPERATURE: 98 F

## 2018-09-17 DIAGNOSIS — M65.4 DE QUERVAIN'S TENOSYNOVITIS, RIGHT: Primary | ICD-10-CM

## 2018-09-17 RX ORDER — NAPROXEN 500 MG/1
500 TABLET ORAL 2 TIMES DAILY WITH MEALS
Qty: 45 TAB | Refills: 0 | Status: SHIPPED | OUTPATIENT
Start: 2018-09-17 | End: 2018-10-11

## 2018-09-17 NOTE — MR AVS SNAPSHOT
[address] 
[phone] Patient: Vivi Clark MRN: [id number] :1965 Visit Information Date & Time Provider Department Dept. Phone Encounter #  
 2018  2:30 PM Andre Leo 34 [id number] Follow-up Instructions Return in about 4 weeks (around 10/15/2018). Upcoming Health Maintenance Date Due  
 MenB Meningococcal topic (1 of 2 - Bexsero 2-Dose Series) 1975 Pneumococcal 19-64 Highest Risk (1 of 3 - PCV13) 1984 DTaP/Tdap/Td series (1 - Tdap) 1986 PAP AKA CERVICAL CYTOLOGY 2018 Influenza Age 5 to Adult 2018 BREAST CANCER SCRN MAMMOGRAM 2020 COLONOSCOPY 2020 Allergies as of 2018  Review Complete On: 2018 By: Maritza Lambert MD  
 No Known Allergies Current Immunizations  Reviewed on 2018 Name Date Influenza Vaccine (Quad) PF 2018  1:27 PM  
  
 Not reviewed this visit You Were Diagnosed With   
  
 Codes Comments De Quervain's tenosynovitis, right    -  Primary ICD-10-CM: M65.4 ICD-9-CM: 727.04 Vitals BP Pulse Temp Resp Height(growth percentile) Weight(growth percentile) 110/65 (BP 1 Location: Left arm, BP Patient Position: Sitting) 76 98 °F (36.7 °C) (Oral) 18 5' 4\" (1.626 m) 165 lb (74.8 kg) LMP BMI OB Status Smoking Status 2013 28.32 kg/m2 Postmenopausal Former Smoker Vitals History BMI and BSA Data Body Mass Index Body Surface Area  
 28.32 kg/m 2 1.84 m 2 Preferred Pharmacy Pharmacy Name Phone [address] [phone] Your Updated Medication List  
  
   
This list is accurate as of 18  3:23 PM.  Always use your most recent med list.  
  
  
  
  
 DAILY MULTIVITAMIN PO  
 Take  by mouth daily. OMEGA-3 FATTY ACIDS-FISH OIL PO Take  by mouth. Follow-up Instructions Return in about 4 weeks (around 10/15/2018). Patient Instructions No more than 3000 mg of Tylenol daily to avoid LIVER DAMAGE Take Naproxen 500mg twice daily with meals for 1 week then as needed thereafter Follow instructions on printout. Introducing [facility] & HEALTH SERVICES! [facility] [employer]redondomagdalena introduces [employer] patient portal. Now you can access parts of your medical record, email your doctor's office, and request medication refills online. 1. In your internet browser, go to [email or web address] 2. Click on the First Time User? Click Here link in the Sign In box. You will see the New Member Sign Up page. 3. Enter your [employer] Access Code exactly as it appears below. You will not need to use this code after youve completed the sign-up process. If you do not sign up before the expiration date, you must request a new code. · [employer] Access Code: [email or web address] Expires: 12/16/2018  3:23 PM 
 
4. Enter the last four digits of your Social Security Number (xxxx) and Date of Birth (mm/dd/yyyy) as indicated and click Submit. You will be taken to the next sign-up page. 5. Create a [employer] ID. This will be your [employer] login ID and cannot be changed, so think of one that is secure and easy to remember. 6. Create a [employer] password. You can change your password at any time. 7. Enter your Password Reset Question and Answer. This can be used at a later time if you forget your password. 8. Enter your e-mail address. You will receive e-mail notification when new information is available in [address] 9. Click Sign Up. You can now view and download portions of your medical record. 10. Click the Download Summary menu link to download a portable copy of your medical information.  
 
If you have questions, please visit the Frequently Asked Questions section of the [facility]. Remember, [employer] is NOT to be used for urgent needs. For medical emergencies, dial 911. Now available from your iPhone and Android! Please provide this summary of care documentation to your next provider. Your primary care clinician is listed as Marnie Perrin. If you have any questions after today's visit, please call [phone].

## 2018-09-17 NOTE — PROGRESS NOTES
Chief Complaint Patient presents with  Wrist Pain Right 1. Have you been to the ER, urgent care clinic since your last visit? Hospitalized since your last visit? No 
 
 
2. Have you seen or consulted any other health care providers outside of the [address] [place] [address] since your last visit? Include any pap smears or colon screening.  No

## 2018-09-17 NOTE — PATIENT INSTRUCTIONS
No more than 3000 mg of Tylenol daily to avoid LIVER DAMAGE Take Naproxen 500mg twice daily with meals for 1 week then as needed thereafter Follow instructions on printout.

## 2018-09-17 NOTE — PROGRESS NOTES
Family Medicine Follow-Up Progress Note Patient: Rocio Warner 1965, 48 y.o., female Encounter Date: 9/17/2018 ASSESSMENT & PLAN Orders Placed This Encounter  OMEGA-3 FATTY ACIDS-FISH OIL PO Sig: Take  by mouth.  naproxen (NAPROSYN) 500 mg tablet Sig: Take 1 Tab by mouth two (2) times daily (with meals). Do not take Motrin or ibuprofen with this Dispense:  45 Tab Refill:  0 ICD-10-CM ICD-9-CM 1. De Quervain's tenosynovitis, right M65.4 727.04 I suspect that the patient has dequervain's tenosynovitis of the right wrist.  I have recommended to her rest, icing, and anti-inflammatories. I have given her a handout regarding this condition. If she does not have improvement in 2-4 weeks we can send her to or so hand for evaluation and possible potential future treatment. I strongly encouraged her on limiting lifting, flexing activities of the wrist and thumb CHIEF COMPLAINT Chief Complaint Patient presents with  Wrist Pain Right SUBJECTIVE Rocio Warner is a 48 y.o. female presenting today for an acute visit. She is complaining of 5 days of pain just proximal to her right wrist which is worse with thumb movements. She reports this is worse with activity and better with rest but she feels that it is specifically palpable when she palpates her tendons in her wrist.  She has never had pain like this before. She recently did start using a punching bag again which is a new exercise for her recently. Otherwise she reports that she is relatively active. She does not have any systemic signs of infection, she today she denies nausea, vomiting, diarrhea, constipation, fevers, chills, chest pain, shortness of breath. She also had a twinge of left elbow pain at the medial aspect yesterday that has now resolved. She notably has been taking about 2000 mg of Tylenol at one time sometimes 2 times daily. Review of Systems A 12 point review of systems was negative except as noted here or in the HPI. OBJECTIVE Visit Vitals  /65 (BP 1 Location: Left arm, BP Patient Position: Sitting)  Pulse 76  Temp 98 °F (36.7 °C) (Oral)  Resp 18  Ht 5' 4\" (1.626 m)  Wt 165 lb (74.8 kg)  LMP 12/20/2013  BMI 28.32 kg/m2 Physical Exam  
Constitutional: She is oriented to person, place, and time. She appears well-developed and well-nourished. No distress. Eyes: Conjunctivae and EOM are normal.  
Pulmonary/Chest: Breath sounds normal. No respiratory distress. Musculoskeletal:  
     Right hand: She exhibits tenderness. She exhibits normal range of motion, no bony tenderness, normal capillary refill, no deformity, no laceration and no swelling. Normal sensation noted. Hands: 
Neurological: She is alert and oriented to person, place, and time. Skin: Skin is warm and dry. No rash noted. She is not diaphoretic. Psychiatric: She has a normal mood and affect. Her behavior is normal.  
 
 
No results found for any visits on 09/17/18. HISTORICAL Reviewed and updated today, and as noted below: 
 
Past Medical History:  
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  
Procedure Laterality Date  EEG SLEEP DEPRIVED  3/14/2014  IMPLANT BREAST SILICONE/EQ  ? Family History Problem Relation Age of Onset  Osteoporosis Mother  Arthritis-rheumatoid Father  Asthma Sister  Breast Cancer Paternal Aunt History Smoking Status  Former Smoker  Packs/day: 0.25 Smokeless Tobacco  
 Never Used Social History Social History  Marital status:   
 Spouse name: N/A  
 Number of children: N/A  
 Years of education: N/A Social History Main Topics  Smoking status: Former Smoker Packs/day: 0.25  Smokeless tobacco: Never Used  Alcohol use No  
 Drug use: No  
 Sexual activity: Yes Birth control/ protection: Condom Other Topics Concern  None Social History Narrative No Known Allergies No visits with results within 3 Month(s) from this visit. Latest known visit with results is: Abstract on 04/13/2018 Component Date Value Ref Range Status  Creatinine, External 12/26/2017 0.74   Final  
 
 
 
Nusrat Garcia MD 
[address] 09/17/18 2:38 PM 
 
Portions of this note may have been populated using smart dictation software and may have \"sounds-like\" errors present. Pt was counseled on risks, benefits and alternatives of treatment options. All questions were asked and answered and the patient was agreeable with the treatment plan as outlined.

## 2018-10-11 ENCOUNTER — OFFICE VISIT (OUTPATIENT)
Dept: FAMILY MEDICINE CLINIC | Age: 53
End: 2018-10-11

## 2018-10-11 VITALS
SYSTOLIC BLOOD PRESSURE: 118 MMHG | BODY MASS INDEX: 28.68 KG/M2 | WEIGHT: 168 LBS | RESPIRATION RATE: 18 BRPM | TEMPERATURE: 98.4 F | HEIGHT: 64 IN | DIASTOLIC BLOOD PRESSURE: 73 MMHG | HEART RATE: 89 BPM

## 2018-10-11 DIAGNOSIS — S06.9X4A: ICD-10-CM

## 2018-10-11 DIAGNOSIS — J02.9 SORE THROAT: ICD-10-CM

## 2018-10-11 DIAGNOSIS — F32.0 MILD MAJOR DEPRESSION, SINGLE EPISODE (HCC): Primary | ICD-10-CM

## 2018-10-11 LAB
S PYO AG THROAT QL: NEGATIVE
VALID INTERNAL CONTROL?: YES

## 2018-10-11 RX ORDER — SERTRALINE HYDROCHLORIDE 50 MG/1
50 TABLET, FILM COATED ORAL DAILY
Qty: 30 TAB | Refills: 0 | Status: SHIPPED | OUTPATIENT
Start: 2018-10-11 | End: 2018-11-08 | Stop reason: SDUPTHER

## 2018-10-11 RX ORDER — PENICILLIN V POTASSIUM 500 MG/1
500 TABLET, FILM COATED ORAL 4 TIMES DAILY
Qty: 40 TAB | Refills: 0 | Status: SHIPPED | OUTPATIENT
Start: 2018-10-11 | End: 2018-10-21

## 2018-10-11 NOTE — LETTER
10/21/2018 5:11 PM 
 
Ms. Jayro Burns [address] [facility] [address] Dear Jayro Burns: 
 
Please find your most recent results below. Resulted Orders AMB POC RAPID STREP A Result Value Ref Range VALID INTERNAL CONTROL POC Yes Group A Strep Ag Negative Negative CULTURE, STREP THROAT Result Value Ref Range Beta Strep Gp A Culture Negative Narrative Performed at:  [address] [place] [address]  [id number] : Jorge Blanco MD, Phone:  [phone] RECOMMENDATIONS: 
You did not have strep throat. I hope you are feeling better! Please call me if you have any questions: [phone] Sincerely, 
 
 
Sherman Vee MD

## 2018-10-11 NOTE — PROGRESS NOTES
Chief Complaint Patient presents with  Sore Throat \"not better since last visit\"  Arm Pain  
  rt 1. Have you been to the ER, urgent care clinic since your last visit? Yes [facility] 09/2018  Hospitalized since your last visit? No  
 
2. Have you seen or consulted any other health care providers outside of the [facility] since your last visit? Include any pap smears or colon screening.  No

## 2018-10-11 NOTE — MR AVS SNAPSHOT
[address] 
[phone] Patient: Belén Rodríguez MRN: [id number] :1965 Visit Information Date & Time Provider Department Dept. Phone Encounter #  
 10/11/2018  8:00 AM Andre Rivas 34 [id number] Upcoming Health Maintenance Date Due  
 MenB Meningococcal topic (1 of 2 - Bexsero 2-Dose Series) 1975 Pneumococcal 19-64 Highest Risk (1 of 3 - PCV13) 1984 DTaP/Tdap/Td series (1 - Tdap) 1986 Shingrix Vaccine Age 50> (1 of 2) 2015 PAP AKA CERVICAL CYTOLOGY 2018 Influenza Age 5 to Adult 2018 BREAST CANCER SCRN MAMMOGRAM 2020 COLONOSCOPY 2020 Allergies as of 10/11/2018  Review Complete On: 10/11/2018 By: Héctor Joseph MD  
 No Known Allergies Current Immunizations  Reviewed on 2018 Name Date Influenza Vaccine (Quad) PF 2018  1:27 PM  
  
 Not reviewed this visit You Were Diagnosed With   
  
 Codes Comments Mild major depression, single episode ([facility]    -  Primary ICD-10-CM: F32.0 ICD-9-CM: 296.21 Closed traumatic brain injury, with loss of consciousness of 6 hours to 24 hours, initial encounter [facility])     ICD-10-CM: S06. 9X4A 
ICD-9-CM: 854.03 Sore throat     ICD-10-CM: J02.9 ICD-9-CM: 350 Vitals BP Pulse Temp Resp Height(growth percentile) Weight(growth percentile) 118/73 89 98.4 °F (36.9 °C) (Oral) 18 5' 4\" (1.626 m) 168 lb (76.2 kg) LMP BMI OB Status Smoking Status 2013 28.84 kg/m2 Postmenopausal Former Smoker Vitals History BMI and BSA Data Body Mass Index Body Surface Area  
 28.84 kg/m 2 1.85 m 2 Preferred Pharmacy Pharmacy Name Phone [address] [phone] Your Updated Medication List  
  
   
 This list is accurate as of 10/11/18  9:32 AM.  Always use your most recent med list.  
  
  
  
  
 penicillin v potassium 500 mg tablet Commonly known as:  VEETID Take 1 Tab by mouth four (4) times daily for 10 days. sertraline 50 mg tablet Commonly known as:  ZOLOFT Take 1 Tab by mouth daily. Prescriptions Sent to Pharmacy Refills  
 penicillin v potassium (VEETID) 500 mg tablet 0 Sig: Take 1 Tab by mouth four (4) times daily for 10 days. Class: Normal  
 Pharmacy: [place] [facility] [address] [facility] [employer] Ph #: [phone] Route: Oral  
 sertraline (ZOLOFT) 50 mg tablet 0 Sig: Take 1 Tab by mouth daily. Class: Normal  
 Pharmacy: [facility] [address] [facility] [employer] Ph #: [phone] Route: Oral  
  
We Performed the Following AMB POC RAPID STREP A [34084 CPT(R)] CULTURE, STREP THROAT [id number] CPT(R)] REFERRAL TO PSYCHOLOGY [FGK62 Custom] Comments:  
 Depression, TBI; PLEASE REFER TO DR. Navin paz, [phone]. Referral Information Referral ID Referred By Referred To  
  
 [id number] Louis Reyes Not Available Visits Status Start Date End Date 1 New Request 10/11/18 10/11/19 If your referral has a status of pending review or denied, additional information will be sent to support the outcome of this decision. Introducing [facility]! [employer] introduces [employer]List patient portal. Now you can access parts of your medical record, email your doctor's office, and request medication refills online. 1. In your internet browser, go to [email or web address] 2. Click on the First Time User? Click Here link in the Sign In box. You will see the New Member Sign Up page. 3. Enter your ZipList Access Code exactly as it appears below.  You will not need to use this code after youve completed the sign-up process. If you do not sign up before the expiration date, you must request a new code. · NetSpend Access Code: [email or web address] Expires: 12/16/2018  3:23 PM 
 
4. Enter the last four digits of your Social Security Number (xxxx) and Date of Birth (mm/dd/yyyy) as indicated and click Submit. You will be taken to the next sign-up page. 5. Create a NetSpend ID. This will be your NetSpend login ID and cannot be changed, so think of one that is secure and easy to remember. 6. Create a NetSpend password. You can change your password at any time. 7. Enter your Password Reset Question and Answer. This can be used at a later time if you forget your password. 8. Enter your e-mail address. You will receive e-mail notification when new information is available in [address]. 9. Click Sign Up. You can now view and download portions of your medical record. 10. Click the Download Summary menu link to download a portable copy of your medical information. If you have questions, please visit the Frequently Asked Questions section of the NetSpend website. Remember, NetSpend is NOT to be used for urgent needs. For medical emergencies, dial 911. Now available from your iPhone and Android! Please provide this summary of care documentation to your next provider. Your primary care clinician is listed as Mary Lou Urena. If you have any questions after today's visit, please call [phone].

## 2018-10-11 NOTE — PROGRESS NOTES
HISTORY OF PRESENT ILLNESS Vernell Ortega is a 48 y.o. female. HPI Comments: Vernell rOtega is here with her  today for arm pain and a sore throat. However, her  is concerned about the crying spells she has been having. Also, her PHQ score puts her in the mild depression range. She has never been depressed before. Currently, she is finishing up rehab for TBI. She has had a sore throat for about 3 weeks. Evidently, she went to [facility] and was prescribed amoxicillin, which she finished. She is getting worse. Nothing makes it better, nothing makes it worse. Sore Throat Associated symptoms include congestion and cough. Pertinent negatives include no ear pain, no headaches and no shortness of breath. Arm Pain Pertinent negatives include no chest pain, no headaches and no shortness of breath. Review of Systems Constitutional: Negative for chills, fever and malaise/fatigue. HENT: Positive for congestion and sore throat. Negative for ear pain. Mucous is clear in color. There is no sinus pain. Postnasal drainage. Eyes: Negative for discharge and redness. Respiratory: Positive for cough. Negative for sputum production, shortness of breath and wheezing. Cardiovascular: Negative for chest pain and palpitations. Musculoskeletal: Negative for myalgias. Neurological: Negative for headaches. Psychiatric/Behavioral: Positive for depression. Negative for memory loss, substance abuse and suicidal ideas. The patient has insomnia. The patient is not nervous/anxious. Visit Vitals  /73  Pulse 89  Temp 98.4 °F (36.9 °C) (Oral)  Resp 18  Ht 5' 4\" (1.626 m)  Wt 168 lb (76.2 kg)  LMP 12/20/2013  BMI 28.84 kg/m2 Physical Exam  
Constitutional: She is oriented to person, place, and time. She appears well-developed and well-nourished. No distress. HENT:  
Head: Normocephalic. Right Ear: Tympanic membrane, external ear and ear canal normal.  
Left Ear: Tympanic membrane, external ear and ear canal normal.  
Nose: Nose normal. Right sinus exhibits no maxillary sinus tenderness and no frontal sinus tenderness. Left sinus exhibits no maxillary sinus tenderness and no frontal sinus tenderness. Mouth/Throat: Uvula is midline and mucous membranes are normal. Posterior oropharyngeal edema and posterior oropharyngeal erythema present. No oropharyngeal exudate or tonsillar abscesses. Eyes: Right eye exhibits no discharge. Left eye exhibits no discharge. Right conjunctiva is not injected. Left conjunctiva is not injected. Cardiovascular: Normal rate, regular rhythm and normal heart sounds. Exam reveals no gallop and no friction rub. No murmur heard. Pulmonary/Chest: Effort normal and breath sounds normal. No respiratory distress. She has no wheezes. She has no rales. Lymphadenopathy:  
  She has cervical adenopathy. Neurological: She is alert and oriented to person, place, and time. Skin: Skin is warm and dry. She is not diaphoretic. Nursing note and vitals reviewed. Rapid Strep - Negative ASSESSMENT and PLAN 
  ICD-10-CM ICD-9-CM 1. Mild major depression, single episode ([facility]) F32.0 296.21 sertraline (ZOLOFT) 50 mg tablet REFERRAL TO PSYCHOLOGY 2. Closed traumatic brain injury, with loss of consciousness of 6 hours to 24 hours, [facility] S06. 9X4A 854.03 REFERRAL TO PSYCHOLOGY 3. Sore throat J02.9 462 AMB POC RAPID STREP A  
   penicillin v potassium (VEETID) 500 mg tablet CULTURE, STREP THROAT Depression in a TBI patient Prolonged sore throat Start Zoloft Psychology referral 
Pcn VK Throat culture Follow-up Disposition: 
Return in about 3 weeks (around 11/1/2018) for depression, arm pain/if sore throat not better in 3 days. Reviewed plan of care. Patient has provided input and agrees with goals.

## 2018-10-13 LAB — S PYO THROAT QL CULT: NEGATIVE

## 2018-11-01 ENCOUNTER — TELEPHONE (OUTPATIENT)
Dept: FAMILY MEDICINE CLINIC | Age: 53
End: 2018-11-01

## 2018-11-01 NOTE — TELEPHONE ENCOUNTER
Called pt regarding missed appointment today and left message to call back to reschedule.       Pt rescheduled for next week on 11/8/18 at 1:30 pm. Lissette

## 2018-11-08 ENCOUNTER — OFFICE VISIT (OUTPATIENT)
Dept: FAMILY MEDICINE CLINIC | Age: 53
End: 2018-11-08

## 2018-11-08 VITALS
WEIGHT: 171 LBS | RESPIRATION RATE: 18 BRPM | SYSTOLIC BLOOD PRESSURE: 117 MMHG | DIASTOLIC BLOOD PRESSURE: 79 MMHG | BODY MASS INDEX: 29.19 KG/M2 | TEMPERATURE: 98.5 F | HEART RATE: 70 BPM | HEIGHT: 64 IN

## 2018-11-08 DIAGNOSIS — F32.0 MILD MAJOR DEPRESSION, SINGLE EPISODE (HCC): Primary | ICD-10-CM

## 2018-11-08 DIAGNOSIS — M65.331 TRIGGER MIDDLE FINGER OF RIGHT HAND: ICD-10-CM

## 2018-11-08 DIAGNOSIS — Z23 ENCOUNTER FOR IMMUNIZATION: ICD-10-CM

## 2018-11-08 DIAGNOSIS — M25.531 RIGHT WRIST PAIN: ICD-10-CM

## 2018-11-08 RX ORDER — SERTRALINE HYDROCHLORIDE 50 MG/1
50 TABLET, FILM COATED ORAL DAILY
Qty: 90 TAB | Refills: 0 | Status: SHIPPED | OUTPATIENT
Start: 2018-11-08 | End: 2019-01-30 | Stop reason: SDUPTHER

## 2018-11-08 NOTE — PROGRESS NOTES
HISTORY OF PRESENT ILLNESS  Yuriy Lambert is a 48 y.o. female. Yuriy Lambert is here for follow up on her depression. Her boyfriend is with her today. Since she was last in, she has started Zoloft, which is working great. She has had some mild vertigo, however, this has been recurrent since her head injury. No falls. Nothing is making her depression worse. She scheduled with psychology but cancelled because she was OK. Also, she has been having right wrist pain since September. She was seen then and diagnosed with DeQuervain's tendonitis. Treatment included ice, rest and Naprosyn, however, she is not taking the Naprosyn. This started when she started using a punching bag at the gym. Also, her right middle finger gets stuck in flexion with prolonged rest.        Review of Systems   Constitutional: Negative for malaise/fatigue and weight loss. Weight gain   Respiratory: Negative for shortness of breath. Cardiovascular: Negative for chest pain and palpitations. Musculoskeletal: Positive for joint pain. Neurological: Positive for dizziness. Psychiatric/Behavioral: Negative for depression, substance abuse and suicidal ideas. The patient is not nervous/anxious and does not have insomnia. Visit Vitals  /79   Pulse 70   Temp 98.5 °F (36.9 °C) (Oral)   Resp 18   Ht 5' 4\" (1.626 m)   Wt 171 lb (77.6 kg)   LMP 12/20/2013   BMI 29.35 kg/m²     Physical Exam   Constitutional: She is oriented to person, place, and time. She appears well-developed and well-nourished. No distress. Musculoskeletal:        Right wrist: She exhibits bony tenderness. She exhibits normal range of motion and no swelling. Arms:       Right hand: She exhibits normal range of motion, no tenderness, no bony tenderness, normal capillary refill and no deformity. Normal sensation noted. Neurological: She is alert and oriented to person, place, and time. She displays no tremor.    Skin: She is not diaphoretic. Psychiatric: She has a normal mood and affect. Her behavior is normal. Judgment and thought content normal.       ASSESSMENT and PLAN    ICD-10-CM ICD-9-CM    1. Mild major depression, single episode (HCC) F32.0 296.21 sertraline (ZOLOFT) 50 mg tablet   2. Right wrist pain M25.531 719.43 REFERRAL TO ORTHOPEDICS   3. Trigger middle finger of right hand M65.331 727.03 REFERRAL TO ORTHOPEDICS   4. Encounter for immunization Z23 V03.89 INFLUENZA VIRUS VAC QUAD,SPLIT,PRESV FREE SYRINGE IM        Doing great on Zoloft  Continue current plans. Referral to hand surgery  Flu shot    Follow-up Disposition:  Return in about 3 months (around 2/8/2019) for depression. Reviewed plan of care. Patient has provided input and agrees with goals.

## 2018-11-08 NOTE — PROGRESS NOTES
Chief Complaint   Patient presents with    Depression     3 wk f/u     1. Have you been to the ER, urgent care clinic since your last visit? Hospitalized since your last visit? No     2. Have you seen or consulted any other health care providers outside of the [address] [place] [address] since your last visit? Include any pap smears or colon screening.  No

## 2019-01-30 DIAGNOSIS — F32.0 MILD MAJOR DEPRESSION, SINGLE EPISODE (HCC): ICD-10-CM

## 2019-02-02 RX ORDER — SERTRALINE HYDROCHLORIDE 50 MG/1
TABLET, FILM COATED ORAL
Qty: 90 TAB | Refills: 0 | Status: SHIPPED | OUTPATIENT
Start: 2019-02-02 | End: 2019-05-01 | Stop reason: SDUPTHER

## 2019-03-14 ENCOUNTER — TELEPHONE (OUTPATIENT)
Dept: FAMILY MEDICINE CLINIC | Age: 54
End: 2019-03-14

## 2019-03-14 ENCOUNTER — HOSPITAL ENCOUNTER (OUTPATIENT)
Dept: MAMMOGRAPHY | Age: 54
Discharge: HOME | End: 2019-03-14
Attending: FAMILY MEDICINE
Payer: COMMERCIAL

## 2019-03-14 DIAGNOSIS — Z12.39 SCREENING BREAST EXAMINATION: ICD-10-CM

## 2019-03-14 PROCEDURE — 77067 SCR MAMMO BI INCL CAD: CPT

## 2019-03-14 NOTE — TELEPHONE ENCOUNTER
Pt stopped by to let Dr Shameka Millard know that she had a mammogram today here at [facility].

## 2019-05-01 DIAGNOSIS — F32.0 MILD MAJOR DEPRESSION, SINGLE EPISODE (HCC): ICD-10-CM

## 2019-05-01 RX ORDER — SERTRALINE HYDROCHLORIDE 50 MG/1
TABLET, FILM COATED ORAL
Qty: 90 TAB | Refills: 0 | Status: SHIPPED | OUTPATIENT
Start: 2019-05-01 | End: 2020-01-30

## 2019-05-06 NOTE — TELEPHONE ENCOUNTER
Called and spoke with pt, and she has been advised and states understanding that an appointment is needed for follow up. Pt states she will call office back to schedule.

## 2020-01-30 ENCOUNTER — OFFICE VISIT (OUTPATIENT)
Dept: FAMILY MEDICINE CLINIC | Age: 55
End: 2020-01-30

## 2020-01-30 VITALS
DIASTOLIC BLOOD PRESSURE: 83 MMHG | HEIGHT: 65 IN | TEMPERATURE: 98.2 F | WEIGHT: 176 LBS | HEART RATE: 70 BPM | BODY MASS INDEX: 29.32 KG/M2 | RESPIRATION RATE: 18 BRPM | SYSTOLIC BLOOD PRESSURE: 129 MMHG

## 2020-01-30 DIAGNOSIS — F32.0 MILD MAJOR DEPRESSION, SINGLE EPISODE (HCC): ICD-10-CM

## 2020-01-30 DIAGNOSIS — M65.4 DE QUERVAIN'S TENOSYNOVITIS, RIGHT: ICD-10-CM

## 2020-01-30 DIAGNOSIS — S06.9X4A: ICD-10-CM

## 2020-01-30 DIAGNOSIS — Z01.818 PREOPERATIVE GENERAL PHYSICAL EXAMINATION: Primary | ICD-10-CM

## 2020-01-30 DIAGNOSIS — K21.9 GASTROESOPHAGEAL REFLUX DISEASE, ESOPHAGITIS PRESENCE NOT SPECIFIED: ICD-10-CM

## 2020-01-30 NOTE — PROGRESS NOTES
Chief Complaint   Patient presents with    Pre-op Exam     right wrist- 02/17/2020- [facility] - Dr. Michelle Colvin

## 2020-01-30 NOTE — PROGRESS NOTES
Preoperative Evaluation    Date of Exam: 2020    Yon Monet is a 47 y.o. female (:1965) who presents for preoperative evaluation. She will having a right DeQuervains release 20 under MAC. Latex Allergy: no    Problem List:     Patient Active Problem List    Diagnosis Date Noted    Mild major depression, single episode ([facility] 10/11/2018    [facility]    History of pelvic fracture 2018    Spleen absent 2018    GERD (gastroesophageal reflux disease) 2016    HSV-2 seropositive 2014    Hyperlipidemia 2014    Rectocele 2007     Medical History:     Past Medical History:   Diagnosis Date    Closed TBI (traumatic brain injury) ([facility] 2018    GERD (gastroesophageal reflux disease) 2016    History of pelvic fracture 2018    HSV-2 seropositive 2014    Hyperlipidemia 2014    Mild major [facility], single [facility] 10/11/2018    Rectocele 3/7/2007    Spleen absent 2018     Allergies:   No Known Allergies   Medications:     No current outpatient medications on file. No current facility-administered medications for this visit. Surgical History:     Past Surgical History:   Procedure Laterality Date    EEG SLEEP DEPRIVED  3/14/2014         IMPLANT BREAST SILICONE/EQ  ?      Social History:     Social History     Socioeconomic History    Marital status:      Spouse name: Not on file    Number of children: Not on file    Years of education: Not on file    Highest education level: Not on file   Tobacco Use    Smoking status: Former Smoker     Packs/day: 0.25    Smokeless tobacco: Never Used   Substance and Sexual Activity    Alcohol use: No    Drug use: No    Sexual activity: Yes     Partners: Male     Birth control/protection: Condom       Anesthesia Complications: Yes: Family Hx - father has had problems with anesthesia, but she cannot remember what  History of abnormal bleeding : None  History of Blood Transfusions: no  Health Care Directive or Living Will: no    Objective:     ROS:   Feeling well. No dyspnea or chest pain on exertion. No abdominal pain, change in bowel habits, black or bloody stools. No urinary tract symptoms. No neurological complaints. OBJECTIVE:   The patient appears well, alert, oriented x 3, in no distress. Visit Vitals  /83   Pulse 70   Temp 98.2 °F (36.8 °C) (Oral)   Resp 18   Ht 5' 5\" (1.651 m)   Wt 176 lb (79.8 kg)   LMP 12/20/2013   BMI 29.29 kg/m²     HEENT:Neck supple. No thyromegaly. Chest: Lungs are clear, good air entry, no wheezes, rhonchi or rales. Cardiovascular: S1 and S2 normal, no murmurs, regular rate and rhythm. Abdomen: soft without tenderness, guarding, mass or organomegaly. Extremities: show no edema, normal peripheral pulses. Neurological: is normal, no focal findings. IMPRESSION:     ICD-10-CM ICD-9-CM    1. Preoperative general physical examination Z01.818 V72.83    2. De Quervain's tenosynovitis, right M65.4 727.04    3. Closed traumatic brain injury, with loss of consciousness of 6 hours to 24 hours, initial encounter ([facility] S06. 9X4A 854.03    4. Gastroesophageal reflux disease, esophagitis presence not specified K21.9 530.81    5. Mild major depression, single episode (HCC) F32.0 296.21         Stable, chronic medical problems    Follow-up and Dispositions    · Return in about 3 weeks (around 2/20/2020) for physical.         No contraindications to planned surgery    Reviewed plan of care. Patient has provided input and agrees with goals.       George Henderson MD   1/30/2020

## 2020-02-14 ENCOUNTER — OFFICE VISIT (OUTPATIENT)
Dept: FAMILY MEDICINE CLINIC | Age: 55
End: 2020-02-14

## 2020-02-14 VITALS
WEIGHT: 176 LBS | DIASTOLIC BLOOD PRESSURE: 75 MMHG | HEIGHT: 65 IN | TEMPERATURE: 97.1 F | SYSTOLIC BLOOD PRESSURE: 103 MMHG | RESPIRATION RATE: 18 BRPM | HEART RATE: 90 BPM | BODY MASS INDEX: 29.32 KG/M2

## 2020-02-14 DIAGNOSIS — E78.00 PURE HYPERCHOLESTEROLEMIA: ICD-10-CM

## 2020-02-14 DIAGNOSIS — Z01.419 ENCOUNTER FOR GYNECOLOGICAL EXAMINATION WITHOUT ABNORMAL FINDING: Primary | ICD-10-CM

## 2020-02-14 DIAGNOSIS — Z12.11 SPECIAL SCREENING FOR MALIGNANT NEOPLASMS, COLON: ICD-10-CM

## 2020-02-14 NOTE — PATIENT INSTRUCTIONS
Breast Self-Exam: After Your Visit   Your Care Instructions   A breast self-exam is when you check your breasts for lumps or changes. This regular exam helps you learn how your breasts normally look and feel. Most breast problems or changes are not because of cancer. Breast self-exam is not a substitute for a mammogram. Having regular breast exams by your doctor and regular mammograms improve your chances of finding any problems with your breasts. Some women set a time each month to do a step-by-step breast self-exam. Other women like a less formal system. They might look at their breasts as they brush their teeth, or feel their breasts once in a while in the shower. If you think you may be at risk for breast cancer and are taking hormone replacement therapy, talk to your doctor. He or she may suggest other options for symptoms of menopause. To reduce your risk for breast cancer, maintain a healthy weight and eat a low-fat diet. If you notice a change in your breast, tell your doctor. Follow-up care is a key part of your treatment and safety. Be sure to make and go to all appointments, and call your doctor if you are having problems. Its also a good idea to know your test results and keep a list of the medicines you take. How do you do a breast self-exam?   If you want to do regular breast self-exams, ask your doctor to review the way you do them. Your doctor may give you advice on changing certain movements that will help you feel for changes. The best time to examine your breasts is usually one week after your menstrual period begins. Your breasts should not be tender then. If you do not have periods, you might do your exam on a day of the month that is easy to remember. To examine your breasts:   Remove all your clothes above the waist and lie down. When you are lying down, your breast tissue spreads evenly over your chest wall, which makes it easier to feel all your breast tissue.    Use the pads--not the fingertips--of the 3 middle fingers of your left hand to check your right breast. Move your fingers slowly in small coin-sized circles that overlap. Use three levels of pressure to feel of all your breast tissue. Use light pressure to feel the tissue close to the skin surface. Use medium pressure to feel a little deeper. Use firm pressure to feel your tissue close to your breastbone and ribs. Use each pressure level to feel your breast tissue before moving on to the next spot. Check your entire breast, moving up and down as if following a strip from the collarbone to the bra line, and from the armpit to the ribs. Repeat until you have covered the entire breast.   Repeat this procedure for your left breast, using the pads of the 3 middle fingers of your right hand. To examine your breasts while in the shower:   Place one arm over your head and lightly soap your breast on that side. Using the pads of your fingers, gently move your hand over your breast (in the strip pattern described above), feeling carefully for any lumps or changes. Repeat for the other breast.   Have your doctor inspect anything you notice to see if you need further testing. Where can you learn more? Go to [employer]   Enter P148 in the search box to learn more about \"Breast Self-Exam: After Your Visit. \"    © 4558-5864 [employer]wise, [employer]. Care instructions adapted under license by [facility] [employer] (which disclaims liability or warranty for this information). This care instruction is for use with your licensed healthcare professional. If you have questions about a medical condition or this instruction, always ask your healthcare professional. [place] [id number] any warranty or liability for your use of this information.    Content Version: 1.6.796717; Last Revised: September 9, 2009

## 2020-02-14 NOTE — PROGRESS NOTES
Chief Complaint   Patient presents with    Well Woman     Subjective:  Marilin Salguero is a 47 y.o. female here for annual physical exam.       Health Habits. 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 [address] S06. 9X9A    History of pelvic fracture Z87.81    Spleen absent Q89.01    Mild major depression, single episode ([facility] [address] F32.0     Past Medical History:   Diagnosis Date    Closed TBI (traumatic brain injury) ([facility] [address] 1/9/2018    GERD (gastroesophageal reflux disease) 8/29/2016    History of pelvic fracture 1/9/2018    HSV-2 seropositive 2/4/2014    Hyperlipidemia 2/4/2014    Mild major depression, single episode ([facility] [address] 10/11/2018    Rectocele 3/7/2007    Spleen absent 1/9/2018     Past Surgical History:   Procedure Laterality Date    EEG SLEEP DEPRIVED  3/14/2014         IMPLANT BREAST SILICONE/EQ  ?       Family History   Problem Relation Age of Onset    Osteoporosis Mother    Jyothi Farmer Arthritis-rheumatoid Father     Asthma Sister     Breast Cancer Paternal Aunt      Social History     Tobacco Use    Smoking status: Former Smoker     Packs/day: 0.25    Smokeless tobacco: Never Used   Substance Use Topics    Alcohol use: No     No Known Allergies       Review of Systems  A comprehensive review of systems was negative except for: Cardiovascular: positive for lower extremity edema  Gastrointestinal: positive for reflux symptoms and these occur several times a month    Objective:  Visit Vitals  /75   Pulse 90   Temp 97.1 °F (36.2 °C) (Oral)   Resp 18   Ht 5' 5\" (1.651 m)   Wt 176 lb (79.8 kg)   LMP 12/20/2013   BMI 29.29 kg/m²     Physical Examination:   General appearance - alert, well appearing, and in no distress  Mental status - alert, oriented to person, place, and time, normal mood, behavior, speech, dress, motor activity, and thought processes  Eyes - pupils equal and reactive, extraocular eye movements intact, sclera anicteric  Ears - bilateral TM's and external ear canals normal  Nose - normal and patent, no erythema, discharge or polyps  Mouth - mucous membranes moist, pharynx normal without lesions and dental hygiene good  Neck - supple, no significant adenopathy, carotids upstroke normal bilaterally, no bruits, thyroid exam: thyroid is normal in size without nodules or tenderness  Lymphatics - no palpable lymphadenopathy, no hepatosplenomegaly  Chest - clear to auscultation, no wheezes, rales or rhonchi, symmetric air entry  Heart - normal rate, regular rhythm, normal S1, S2, no murmurs, rubs, clicks or gallops  Abdomen - soft, nontender, nondistended, no masses or organomegaly  bowel sounds normal  Breasts - breasts appear normal, no suspicious masses, no skin or nipple changes or axillary nodes  Pelvic - normal external genitalia, vulva, vagina, cervix, uterus and adnexa  Rectal - normal rectal, no masses  Neurological - alert, oriented, normal speech, no focal findings or movement disorder noted  Musculoskeletal - normal gait  Extremities - peripheral pulses normal, no pedal edema, no clubbing or cyanosis  Skin - normal coloration and turgor, no rashes, no suspicious skin lesions noted     Assessment/Plan:    ICD-10-CM ICD-9-CM    1. Encounter for gynecological examination without abnormal finding M39.723 W19.92 METABOLIC PANEL, COMPREHENSIVE      CBC WITH AUTOMATED DIFF      PAP (IMAGE GUIDED) + HPV HIGH RISK   2. Pure hypercholesterolemia E25.42 744.8 METABOLIC PANEL, COMPREHENSIVE      LIPID PANEL   3.  Special screening for malignant neoplasms, colon Z12.11 V76.51 OCCULT BLOOD IMMUNOASSAY,DIAGNOSTIC         Breast awareness  Labs per orders. Mammogram this spring    Follow-up and Dispositions    · Return in about 1 year (around 2/14/2021) for physical.           Reviewed plan of care. Patient has provided input and agrees with goals.

## 2020-02-15 LAB
ALBUMIN SERPL-MCNC: 4.6 G/DL (ref 3.8–4.9)
ALBUMIN/GLOB SERPL: 2 {RATIO} (ref 1.2–2.2)
ALP SERPL-CCNC: 121 IU/L (ref 39–117)
ALT SERPL-CCNC: 13 IU/L (ref 0–32)
AST SERPL-CCNC: 17 IU/L (ref 0–40)
BASOPHILS # BLD AUTO: 0.1 X10E3/UL (ref 0–0.2)
BASOPHILS NFR BLD AUTO: 1 %
BILIRUB SERPL-MCNC: 1.3 MG/DL (ref 0–1.2)
BUN SERPL-MCNC: 10 MG/DL (ref 6–24)
BUN/CREAT SERPL: 11 (ref 9–23)
CALCIUM SERPL-MCNC: 9.8 MG/DL (ref 8.7–10.2)
CHLORIDE SERPL-SCNC: 100 MMOL/L (ref 96–106)
CHOLEST SERPL-MCNC: 241 MG/DL (ref 100–199)
CO2 SERPL-SCNC: 26 MMOL/L (ref 20–29)
CREAT SERPL-MCNC: 0.9 MG/DL (ref 0.57–1)
EOSINOPHIL # BLD AUTO: 0.7 X10E3/UL (ref 0–0.4)
EOSINOPHIL NFR BLD AUTO: 6 %
ERYTHROCYTE [DISTWIDTH] IN BLOOD BY AUTOMATED COUNT: 13 % (ref 11.7–15.4)
GLOBULIN SER CALC-MCNC: 2.3 G/DL (ref 1.5–4.5)
GLUCOSE SERPL-MCNC: 88 MG/DL (ref 65–99)
HCT VFR BLD AUTO: 44.2 % (ref 34–46.6)
HDLC SERPL-MCNC: 65 MG/DL
HGB BLD-MCNC: 14.8 G/DL (ref 11.1–15.9)
IMM GRANULOCYTES # BLD AUTO: 0 X10E3/UL (ref 0–0.1)
IMM GRANULOCYTES NFR BLD AUTO: 0 %
INTERPRETATION, 910389: NORMAL
LDLC SERPL CALC-MCNC: 159 MG/DL (ref 0–99)
LYMPHOCYTES # BLD AUTO: 4.8 X10E3/UL (ref 0.7–3.1)
LYMPHOCYTES NFR BLD AUTO: 46 %
MCH RBC QN AUTO: 30 PG (ref 26.6–33)
MCHC RBC AUTO-ENTMCNC: 33.5 G/DL (ref 31.5–35.7)
MCV RBC AUTO: 90 FL (ref 79–97)
MONOCYTES # BLD AUTO: 0.8 X10E3/UL (ref 0.1–0.9)
MONOCYTES NFR BLD AUTO: 7 %
NEUTROPHILS # BLD AUTO: 4.2 X10E3/UL (ref 1.4–7)
NEUTROPHILS NFR BLD AUTO: 40 %
PLATELET # BLD AUTO: 429 X10E3/UL (ref 150–450)
POTASSIUM SERPL-SCNC: 4.4 MMOL/L (ref 3.5–5.2)
PROT SERPL-MCNC: 6.9 G/DL (ref 6–8.5)
RBC # BLD AUTO: 4.94 X10E6/UL (ref 3.77–5.28)
SODIUM SERPL-SCNC: 139 MMOL/L (ref 134–144)
TRIGL SERPL-MCNC: 87 MG/DL (ref 0–149)
VLDLC SERPL CALC-MCNC: 17 MG/DL (ref 5–40)
WBC # BLD AUTO: 10.5 X10E3/UL (ref 3.4–10.8)

## 2020-02-17 LAB — HEMOCCULT STL QL IA: NEGATIVE

## 2020-02-22 LAB
CYTOLOGIST CVX/VAG CYTO: NORMAL
CYTOLOGY CVX/VAG DOC CYTO: NORMAL
CYTOLOGY CVX/VAG DOC THIN PREP: NORMAL
DX ICD CODE: NORMAL
HPV I/H RISK 1 DNA CVX QL PROBE+SIG AMP: NEGATIVE
Lab: NORMAL
OTHER STN SPEC: NORMAL
SPECIMEN STATUS REPORT, ROLRST: NORMAL
STAT OF ADQ CVX/VAG CYTO-IMP: NORMAL

## 2020-05-26 ENCOUNTER — HOSPITAL ENCOUNTER (OUTPATIENT)
Dept: MAMMOGRAPHY | Age: 55
Discharge: HOME | End: 2020-05-26
Attending: FAMILY MEDICINE
Payer: COMMERCIAL

## 2020-05-26 DIAGNOSIS — Z12.31 VISIT FOR SCREENING MAMMOGRAM: ICD-10-CM

## 2020-05-26 PROCEDURE — 77067 SCR MAMMO BI INCL CAD: CPT

## 2022-02-28 ENCOUNTER — TELEPHONE (OUTPATIENT)
Dept: FAMILY MEDICINE CLINIC | Age: 57
End: 2022-02-28

## 2022-02-28 NOTE — LETTER
2/28/2022 2:31 PM    Ms. Rickey Simeon [id number]      Dear Ms. Casie Rincon missed you! Please call our office at [phone] and schedule a follow up appointment for your continued care.         Sincerely,      Candy Styles MD